# Patient Record
Sex: FEMALE | Race: BLACK OR AFRICAN AMERICAN | NOT HISPANIC OR LATINO | Employment: UNEMPLOYED | URBAN - METROPOLITAN AREA
[De-identification: names, ages, dates, MRNs, and addresses within clinical notes are randomized per-mention and may not be internally consistent; named-entity substitution may affect disease eponyms.]

---

## 2020-01-08 ENCOUNTER — OFFICE VISIT (OUTPATIENT)
Dept: PODIATRY | Facility: CLINIC | Age: 35
End: 2020-01-08
Payer: MEDICAID

## 2020-01-08 VITALS
RESPIRATION RATE: 19 BRPM | SYSTOLIC BLOOD PRESSURE: 135 MMHG | HEART RATE: 101 BPM | DIASTOLIC BLOOD PRESSURE: 92 MMHG | TEMPERATURE: 97 F | BODY MASS INDEX: 26.81 KG/M2 | WEIGHT: 142 LBS | OXYGEN SATURATION: 97 % | HEIGHT: 61 IN

## 2020-01-08 DIAGNOSIS — L97.501 ULCER OF FOOT, LIMITED TO BREAKDOWN OF SKIN, UNSPECIFIED LATERALITY: ICD-10-CM

## 2020-01-08 DIAGNOSIS — B35.3 TINEA PEDIS OF BOTH FEET: Primary | ICD-10-CM

## 2020-01-08 PROCEDURE — 99204 OFFICE O/P NEW MOD 45 MIN: CPT | Mod: PBBFAC | Performed by: PODIATRIST

## 2020-01-08 PROCEDURE — 99999 PR PBB SHADOW E&M-NEW PATIENT-LVL IV: CPT | Mod: PBBFAC,,, | Performed by: PODIATRIST

## 2020-01-08 PROCEDURE — 99999 PR PBB SHADOW E&M-NEW PATIENT-LVL IV: ICD-10-PCS | Mod: PBBFAC,,, | Performed by: PODIATRIST

## 2020-01-08 PROCEDURE — 99203 OFFICE O/P NEW LOW 30 MIN: CPT | Mod: S$PBB,,, | Performed by: PODIATRIST

## 2020-01-08 PROCEDURE — 99203 PR OFFICE/OUTPT VISIT, NEW, LEVL III, 30-44 MIN: ICD-10-PCS | Mod: S$PBB,,, | Performed by: PODIATRIST

## 2020-01-08 RX ORDER — DUREZOL 0.5 MG/ML
EMULSION OPHTHALMIC
COMMUNITY
Start: 2019-11-18

## 2020-01-08 RX ORDER — ATROPINE SULFATE 10 MG/ML
SOLUTION OPHTHALMIC
COMMUNITY
Start: 2019-12-09

## 2020-01-08 RX ORDER — CLOZAPINE 50 MG/1
TABLET ORAL
COMMUNITY
Start: 2019-11-18

## 2020-01-08 RX ORDER — NORTRIPTYLINE HYDROCHLORIDE 25 MG/1
CAPSULE ORAL
COMMUNITY
Start: 2019-11-18

## 2020-01-08 RX ORDER — LITHIUM CARBONATE 300 MG/1
CAPSULE ORAL
COMMUNITY
Start: 2019-11-18

## 2020-01-08 RX ORDER — KETOCONAZOLE 20 MG/G
CREAM TOPICAL
COMMUNITY
Start: 2019-11-25

## 2020-01-08 RX ORDER — LORATADINE 10 MG/1
TABLET ORAL
COMMUNITY
Start: 2019-11-18

## 2020-01-08 RX ORDER — MULTIVITAMIN
TABLET ORAL
COMMUNITY
Start: 2019-11-18

## 2020-01-08 RX ORDER — LUBIPROSTONE 24 UG/1
CAPSULE, GELATIN COATED ORAL
COMMUNITY
Start: 2019-11-18

## 2020-01-08 RX ORDER — CLONIDINE HYDROCHLORIDE 0.1 MG/1
TABLET ORAL
COMMUNITY
Start: 2019-11-18

## 2020-01-08 RX ORDER — CYANOCOBALAMIN (VITAMIN B-12) 100 MCG
TABLET ORAL
COMMUNITY
Start: 2019-12-05

## 2020-01-08 RX ORDER — LITHIUM CARBONATE 150 MG/1
CAPSULE ORAL
COMMUNITY
Start: 2019-11-18

## 2020-01-08 RX ORDER — AMMONIUM LACTATE 12 G/100G
LOTION TOPICAL
COMMUNITY
Start: 2019-10-14

## 2020-01-08 RX ORDER — GLYCOPYRROLATE 2 MG/1
TABLET ORAL
COMMUNITY
Start: 2019-11-18

## 2020-01-08 RX ORDER — NORETHINDRONE AND ETHINYL ESTRADIOL 0.4-0.035
KIT ORAL
COMMUNITY
Start: 2019-11-11

## 2020-01-08 RX ORDER — NALTREXONE HYDROCHLORIDE 50 MG/1
TABLET, FILM COATED ORAL
COMMUNITY
Start: 2019-12-09

## 2020-01-08 RX ORDER — CLOZAPINE 200 MG/1
TABLET ORAL
COMMUNITY
Start: 2019-11-18

## 2020-01-08 RX ORDER — GLUCOSAMINE/CHONDR SU A SOD 750-600 MG
TABLET ORAL
COMMUNITY
Start: 2019-11-18

## 2020-01-11 PROBLEM — L97.501 ULCER OF FOOT, LIMITED TO BREAKDOWN OF SKIN: Status: ACTIVE | Noted: 2020-01-11

## 2020-01-11 PROBLEM — B35.3 TINEA PEDIS OF BOTH FEET: Status: ACTIVE | Noted: 2020-01-11

## 2020-01-12 NOTE — PROGRESS NOTES
Subjective:       Patient ID: Pratima Perry is a 34 y.o. female.    Chief Complaint: Callouses and Foot Pain (both feet)   Patient presents today from a rehab facility with 2 caregivers with her the patient is somewhat combative and they states that the patient has been having severe pain in both feet since Thanksgiving.    History reviewed. No pertinent past medical history.  History reviewed. No pertinent surgical history.  History reviewed. No pertinent family history.  Social History     Socioeconomic History    Marital status: Single     Spouse name: Not on file    Number of children: Not on file    Years of education: Not on file    Highest education level: Not on file   Occupational History    Not on file   Social Needs    Financial resource strain: Not on file    Food insecurity:     Worry: Not on file     Inability: Not on file    Transportation needs:     Medical: Not on file     Non-medical: Not on file   Tobacco Use    Smoking status: Never Smoker    Smokeless tobacco: Never Used   Substance and Sexual Activity    Alcohol use: Never     Frequency: Never    Drug use: Never    Sexual activity: Never   Lifestyle    Physical activity:     Days per week: Not on file     Minutes per session: Not on file    Stress: Not on file   Relationships    Social connections:     Talks on phone: Not on file     Gets together: Not on file     Attends [employer] service: Not on file     Active member of club or organization: Not on file     Attends meetings of clubs or organizations: Not on file     Relationship status: Not on file   Other Topics Concern    Not on file   Social History Narrative    Not on file       Current Outpatient Medications   Medication Sig Dispense Refill    AMITIZA 24 mcg Cap       ammonium lactate (LAC-HYDRIN) 12 % lotion APPLY EVERY DAY TO BOTH FEET AT BEDTIME      ATHLETE'S FOOT, TERBINAFINE, 1 % cream       BALZIVA, 28, 0.4-35 mg-mcg per tablet       cloNIDine (CATAPRES) 0.1  "MG tablet       cloZAPine (CLOZARIL) 200 MG tablet       cloZAPine (CLOZARIL) 50 MG tablet       DUREZOL 0.05 % Drop ophthalmic solution       glycopyrrolate (ROBINUL) 2 MG Tab       ISOPTO ATROPINE 1 % Drop       ketoconazole (NIZORAL) 2 % cream       lithium (ESKALITH) 300 MG capsule       lithium carbonate 150 MG capsule       loratadine (CLARITIN) 10 mg tablet       multivitamin (THERAGRAN) per tablet       naltrexone (DEPADE) 50 mg tablet       nortriptyline (PAMELOR) 25 MG capsule       vitamin E mixed 400 unit Cap        No current facility-administered medications for this visit.      Review of patient's allergies indicates:  No Known Allergies    Review of Systems   Skin: Positive for wound.   All other systems reviewed and are negative.      Objective:      Vitals:    01/08/20 0900   BP: (!) 135/92   Pulse: 101   Resp: 19   Temp: 97.4 °F (36.3 °C)   TempSrc: Oral   SpO2: 97%   Weight: 64.4 kg (142 lb)   Height: 5' 1" (1.549 m)     Physical Exam   Constitutional: She appears well-developed and well-nourished.   Cardiovascular:   Pulses:       Dorsalis pedis pulses are 2+ on the right side, and 2+ on the left side.        Posterior tibial pulses are 1+ on the right side, and 1+ on the left side.   Pulmonary/Chest: Effort normal.   Musculoskeletal: She exhibits tenderness and deformity.        Right foot: There is deformity.        Left foot: There is deformity.   Feet:   Right Foot:   Protective Sensation: 4 sites tested. 4 sites sensed.   Skin Integrity: Positive for ulcer, skin breakdown and erythema.   Left Foot:   Protective Sensation: 4 sites tested. 4 sites sensed.   Skin Integrity: Positive for ulcer, skin breakdown and erythema.   Neurological: She is alert.   Skin: Capillary refill takes less than 2 seconds. There is erythema.   Psychiatric: She is combative. Cognition and memory are impaired. She is noncommunicative.   Nursing note and vitals reviewed.                         "   Assessment:       1. Tinea pedis of both feet    2. Ulcer of foot, limited to breakdown of skin, unspecified laterality        Plan:       Patient presents today from a rehabilitation facility/nursing facility with 2 caregivers present who related that the patient has been having severe pain in both feet since Thanksgiving.  Patient has severe interdigital maceration this is especially bad in the 4th webspace bilateral with tissue breakdown and ulceration of the 4th webspace patient has significant discomfort on examination of this area.  Fourth webspace right foot was more severely involved than the left foot additionally patient has hyperkeratotic pre ulcerative lesions plantar forefoot and heel bilateral. Patient has excessively dry cracking skin at risk for fissure and infection heels bilateral this area was not as tender as the 4th web spaces that had significant tissue breakdown.  I have hand written orders for the takes she patient to take back to the care facility with her I have advised them to not put any cream or lotion or any type of ointment between the patient's toes instead of going to have them apply Betadine to the 4th webspace on a daily basis they need to make sure that they dry this area very well after the patient bathes and this area needs to be monitored closely additionally I have recommended a 40% urea cream to be applied to the patient's heels dorsal and plantar foot but not in the web spaces this will help to slowly over time address the hyperkeratotic lesions on the heel and forefoot slowly breaking them down softening the patient's skin to prevent further breakdown and subsequent ulceration.  I have ordered that the urea be a clip applied twice daily to the areas previously mentioned and I have also advised the nursing surface facility to contact us with any questions or concerns regarding these orders.  Patient currently resides at [facility].  Follow-up will be  as needed I have advised the caregiver is present today if they are not seeing improvement in any of these areas whether it be the dry cracking skin pre ulcerative areas or the interdigital tissue breakdown of the 4th web spaces bilateral that I want see the patient in the next 2-3 weeks certainly if this gets progressively worse sooner than that they are to contact me sooner than that for follow-up.  Total face-to-face time including discussion evaluation new patient exam and treatment equaled 30 min.  Hyperkeratotic lesions were not able to be debrided due to the patient's kicking motion there was concern for the patient's safety trying to debride these areas therefore I elected to proceed with more conservatively with topical 40% urea treatment.  While the patient was combative the patient did not require restraint during the office visit or treatment.This note was created using [employer] voice recognition software that occasionally misinterpreted phrases or words.